# Patient Record
Sex: MALE | Race: WHITE
[De-identification: names, ages, dates, MRNs, and addresses within clinical notes are randomized per-mention and may not be internally consistent; named-entity substitution may affect disease eponyms.]

---

## 2022-03-18 ENCOUNTER — HOSPITAL ENCOUNTER (EMERGENCY)
Dept: HOSPITAL 46 - ED | Age: 47
Discharge: HOME | End: 2022-03-18
Payer: COMMERCIAL

## 2022-03-18 VITALS — WEIGHT: 148 LBS | HEIGHT: 71 IN | BODY MASS INDEX: 20.72 KG/M2

## 2022-03-18 DIAGNOSIS — H11.32: Primary | ICD-10-CM

## 2022-03-18 DIAGNOSIS — F17.200: ICD-10-CM

## 2022-03-18 DIAGNOSIS — Z88.1: ICD-10-CM

## 2022-03-18 DIAGNOSIS — Z88.0: ICD-10-CM

## 2022-10-15 ENCOUNTER — HOSPITAL ENCOUNTER (EMERGENCY)
Dept: HOSPITAL 46 - ED | Age: 47
Discharge: HOME | End: 2022-10-15
Payer: COMMERCIAL

## 2022-10-15 VITALS — BODY MASS INDEX: 20.72 KG/M2 | HEIGHT: 71 IN | WEIGHT: 148 LBS

## 2022-10-15 DIAGNOSIS — F17.200: ICD-10-CM

## 2022-10-15 DIAGNOSIS — L03.114: Primary | ICD-10-CM

## 2022-10-15 PROCEDURE — A9270 NON-COVERED ITEM OR SERVICE: HCPCS

## 2022-10-19 ENCOUNTER — HOSPITAL ENCOUNTER (EMERGENCY)
Dept: HOSPITAL 46 - ED | Age: 47
Discharge: HOME | End: 2022-10-19
Payer: COMMERCIAL

## 2022-10-19 VITALS — BODY MASS INDEX: 20.72 KG/M2 | HEIGHT: 71 IN | WEIGHT: 148 LBS

## 2022-10-19 DIAGNOSIS — Z79.899: ICD-10-CM

## 2022-10-19 DIAGNOSIS — F17.200: ICD-10-CM

## 2022-10-19 DIAGNOSIS — A25.9: Primary | ICD-10-CM

## 2022-10-19 DIAGNOSIS — Z88.0: ICD-10-CM

## 2022-10-19 NOTE — XMS
PreManage Notification: MARCIANO DONALDSON MRN:[id number]
 
Security Information
 
Security Events
No recent Security Events currently on file
 
 
 
CRITERIA MET
------------
- [facility] - 2 Visits in 30 Days
 
 
CARE PROVIDERS
There are no care providers on record at this time.
 
[facility] has no Care Guidelines for this patient.
 
MORRIS VISIT COUNT (12 MO.)
-------------------------------------------------------------------------------------
3 [facility]
-------------------------------------------------------------------------------------
TOTAL 3
-------------------------------------------------------------------------------------
NOTE: Visits indicate total known visits.
 
ED/[facility] VISIT TRACKING (12 MO.)
-------------------------------------------------------------------------------------
10/19/2022 10:02
[facility]     [place]
 
TYPE: Emergency
 
COMPLAINT:
- FEVER, ANIMAL BITE
-------------------------------------------------------------------------------------
10/15/2022 20:35
[facility] [place]
 
TYPE: Emergency
 
COMPLAINT:
- RAT BITE
-------------------------------------------------------------------------------------
03/18/2022 15:23
[facility] [place]
 
TYPE: Emergency
 
COMPLAINT:
- EYE PROBLEM
 
DIAGNOSES:
- Conjunctival hemorrhage, left eye
- Nicotine dependence, unspecified, uncomplicated
- Allergy status to penicillin
- Other specified disorders of eye and adnexa
- Allergy status to other antibiotic agents
 
-------------------------------------------------------------------------------------
 
 
INPATIENT VISIT TRACKING (12 MO.)
No inpatient visits to display in this time frame
 
[email or web address]

## 2022-10-20 ENCOUNTER — HOSPITAL ENCOUNTER (EMERGENCY)
Dept: HOSPITAL 46 - ED | Age: 47
Discharge: HOME | End: 2022-10-20
Payer: COMMERCIAL

## 2022-10-20 VITALS — WEIGHT: 148 LBS | HEIGHT: 71 IN | BODY MASS INDEX: 20.72 KG/M2

## 2022-10-20 DIAGNOSIS — F17.200: ICD-10-CM

## 2022-10-20 DIAGNOSIS — Z88.8: ICD-10-CM

## 2022-10-20 DIAGNOSIS — Z88.0: ICD-10-CM

## 2022-10-20 DIAGNOSIS — A25.9: Primary | ICD-10-CM

## 2022-10-20 NOTE — XMS
PreManage Notification: MARCIANO DONALDSON MRN:[id number]
 
Security Information
 
Security Events
No recent Security Events currently on file
 
 
 
CRITERIA MET
------------
- [facility] - 2 Visits in 30 Days
 
 
CARE PROVIDERS
There are no care providers on record at this time.
 
[facility] has no Care Guidelines for this patient.
 
MORRIS VISIT COUNT (12 MO.)
-------------------------------------------------------------------------------------
4 [facility]
-------------------------------------------------------------------------------------
TOTAL 4
-------------------------------------------------------------------------------------
NOTE: Visits indicate total known visits.
 
ED/C VISIT TRACKING (12 MO.)
-------------------------------------------------------------------------------------
10/20/2022 08:53
[facility] [place]
 
TYPE: Emergency
 
COMPLAINT:
- WOUND CHECK
-------------------------------------------------------------------------------------
10/19/2022 10:02
[facility] [place]
 
TYPE: Emergency
 
COMPLAINT:
- FEVER, ANIMAL BITE
-------------------------------------------------------------------------------------
10/15/2022 20:35
[facility] [place]
 
TYPE: Emergency
 
COMPLAINT:
- RAT BITE
-------------------------------------------------------------------------------------
03/18/2022 15:23
[facility] [place]
 
TYPE: Emergency
 
COMPLAINT:
 
- EYE PROBLEM
 
DIAGNOSES:
- Conjunctival hemorrhage, left eye
- Nicotine dependence, unspecified, uncomplicated
- Allergy status to penicillin
- Other specified disorders of eye and adnexa
- Allergy status to other antibiotic agents
-------------------------------------------------------------------------------------
 
 
INPATIENT VISIT TRACKING (12 MO.)
No inpatient visits to display in this time frame
 
[email or web address]

## 2025-07-24 ENCOUNTER — HOSPITAL ENCOUNTER (EMERGENCY)
Dept: HOSPITAL 46 - ED | Age: 50
Discharge: HOME | End: 2025-07-24
Payer: COMMERCIAL

## 2025-07-24 VITALS — DIASTOLIC BLOOD PRESSURE: 74 MMHG | SYSTOLIC BLOOD PRESSURE: 146 MMHG

## 2025-07-24 VITALS — WEIGHT: 163.14 LBS | HEIGHT: 71 IN | BODY MASS INDEX: 22.84 KG/M2

## 2025-07-24 DIAGNOSIS — F17.200: ICD-10-CM

## 2025-07-24 DIAGNOSIS — Z88.8: ICD-10-CM

## 2025-07-24 DIAGNOSIS — B35.4: Primary | ICD-10-CM

## 2025-07-24 DIAGNOSIS — Z79.899: ICD-10-CM

## 2025-07-24 DIAGNOSIS — Z79.2: ICD-10-CM

## 2025-07-24 DIAGNOSIS — Z88.0: ICD-10-CM

## 2025-07-24 DIAGNOSIS — Z88.1: ICD-10-CM
